# Patient Record
Sex: FEMALE | Race: OTHER | NOT HISPANIC OR LATINO
[De-identification: names, ages, dates, MRNs, and addresses within clinical notes are randomized per-mention and may not be internally consistent; named-entity substitution may affect disease eponyms.]

---

## 2019-12-19 PROBLEM — Z00.00 ENCOUNTER FOR PREVENTIVE HEALTH EXAMINATION: Status: ACTIVE | Noted: 2019-12-19

## 2022-09-07 ENCOUNTER — APPOINTMENT (OUTPATIENT)
Dept: NEUROLOGY | Facility: CLINIC | Age: 42
End: 2022-09-07

## 2022-09-07 VITALS
BODY MASS INDEX: 34.37 KG/M2 | OXYGEN SATURATION: 99 % | DIASTOLIC BLOOD PRESSURE: 74 MMHG | HEART RATE: 88 BPM | SYSTOLIC BLOOD PRESSURE: 122 MMHG | WEIGHT: 201.31 LBS | TEMPERATURE: 97.6 F | HEIGHT: 64 IN

## 2022-09-07 DIAGNOSIS — Z87.42 PERSONAL HISTORY OF OTHER DISEASES OF THE FEMALE GENITAL TRACT: ICD-10-CM

## 2022-09-07 DIAGNOSIS — Z78.9 OTHER SPECIFIED HEALTH STATUS: ICD-10-CM

## 2022-09-07 DIAGNOSIS — Z83.3 FAMILY HISTORY OF DIABETES MELLITUS: ICD-10-CM

## 2022-09-07 DIAGNOSIS — R20.0 ANESTHESIA OF SKIN: ICD-10-CM

## 2022-09-07 DIAGNOSIS — Z86.39 PERSONAL HISTORY OF OTHER ENDOCRINE, NUTRITIONAL AND METABOLIC DISEASE: ICD-10-CM

## 2022-09-07 PROCEDURE — 99215 OFFICE O/P EST HI 40 MIN: CPT

## 2022-09-07 RX ORDER — LEVOTHYROXINE SODIUM 0.1 MG/1
100 TABLET ORAL DAILY
Refills: 0 | Status: ACTIVE | COMMUNITY

## 2022-09-08 NOTE — PHYSICAL EXAM
[FreeTextEntry1] : GEN: NAD, pleasant, cooperative\par CVS: RRR, no carotid bruit\par CHEST: No signs of  distress, on room air\par ABD: Soft, NTTP\par NEURO: \par   MENTAL STATUS: AAOx3\par   LANG/SPEECH: Fluent, intact naming, repetition & comprehension\par   CRANIAL NERVES:\par   II: Pupils equal and reactive, no RAPD, normal visual field and fundus\par   III, IV, VI: EOM intact, no gaze preference or deviation\par   V: normal\par   VII: no facial asymmetry\par   VIII: normal hearing to speech\par   MOTOR: 5/5 in both upper and lower extremities. L foot: 5/5 with foot flexion/ extension, eversion and inversion. L big toe 4+/5 \par   REFLEXES: 2/4 throughout, bilateral flexor plantars\par   SENSORY: Normal to touch, temperature & pin prick in all extremiteis\par   COORD: Normal finger to nose and heel to shin, no tremor, no dysmetria\par

## 2022-09-08 NOTE — HISTORY OF PRESENT ILLNESS
[FreeTextEntry1] : 41 year old with PMH of carpal tunnel dx, used to follow up with Dr Tabares here in office for evaluation of tingling sensation over the lateral aspect of the L lower leg, not pins or needles, not painful , but very uncomfortable. SHe also reports occasional shooting pain if she squeezes her buttocks. No back pain\par Used to sit in butterfly positions, now hardly ever crosses her legs together. \par No recent trauma, accident or twisting. Weight has been stable.\par She follows with chiropractor *and she felt that numbness is better. after  each session but it comes back\par she denies having had any significant change in her life style and the surrounding . No weight change\par denies weakness or clumsiness. No change in the bladder function\par She says they came back from a long driving vacation to [place] and perhaps it made her symptoms worse\par \par \par \par

## 2022-09-08 NOTE — ASSESSMENT
[FreeTextEntry1] : 41 year old with PMH of carpal tunnel dx, used to follow up with Dr Tabares here in office for evaluation of tingling sensation over the lateral aspect of the L lower leg. Neuro exam with no weakness or sensory deficits, except fro subtle L big toe weakness. \par Symptoms and PE suggestive of L5 radiculopathy\par \par Plan:\par -Physical therapy\par -Consider  do EMG and MRI L spine \par -Follow up in 6 months or as needed